# Patient Record
Sex: FEMALE | Race: WHITE | NOT HISPANIC OR LATINO | Employment: FULL TIME | URBAN - METROPOLITAN AREA
[De-identification: names, ages, dates, MRNs, and addresses within clinical notes are randomized per-mention and may not be internally consistent; named-entity substitution may affect disease eponyms.]

---

## 2024-05-10 ENCOUNTER — OFFICE VISIT (OUTPATIENT)
Dept: PRIMARY CARE | Facility: CLINIC | Age: 34
End: 2024-05-10
Payer: COMMERCIAL

## 2024-05-10 VITALS
BODY MASS INDEX: 22.34 KG/M2 | HEART RATE: 65 BPM | HEIGHT: 66 IN | WEIGHT: 139 LBS | SYSTOLIC BLOOD PRESSURE: 112 MMHG | OXYGEN SATURATION: 98 % | DIASTOLIC BLOOD PRESSURE: 74 MMHG

## 2024-05-10 DIAGNOSIS — R53.83 OTHER FATIGUE: ICD-10-CM

## 2024-05-10 DIAGNOSIS — Z00.00 ANNUAL PHYSICAL EXAM: Primary | ICD-10-CM

## 2024-05-10 DIAGNOSIS — M54.9 ACUTE BACK PAIN, UNSPECIFIED BACK LOCATION, UNSPECIFIED BACK PAIN LATERALITY: ICD-10-CM

## 2024-05-10 DIAGNOSIS — Z01.419 ENCOUNTER FOR GYNECOLOGICAL EXAMINATION: ICD-10-CM

## 2024-05-10 LAB
25(OH)D3 SERPL-MCNC: 48 NG/ML (ref 30–100)
ALBUMIN SERPL BCP-MCNC: 4.4 G/DL (ref 3.4–5)
ALP SERPL-CCNC: 32 U/L (ref 33–110)
ALT SERPL W P-5'-P-CCNC: 18 U/L (ref 7–45)
ANION GAP SERPL CALC-SCNC: 14 MMOL/L (ref 10–20)
AST SERPL W P-5'-P-CCNC: 19 U/L (ref 9–39)
BASOPHILS # BLD AUTO: 0.05 X10*3/UL (ref 0–0.1)
BASOPHILS NFR BLD AUTO: 0.8 %
BILIRUB SERPL-MCNC: 0.7 MG/DL (ref 0–1.2)
BUN SERPL-MCNC: 17 MG/DL (ref 6–23)
CALCIUM SERPL-MCNC: 9 MG/DL (ref 8.6–10.6)
CHLORIDE SERPL-SCNC: 104 MMOL/L (ref 98–107)
CHOLEST SERPL-MCNC: 167 MG/DL (ref 0–199)
CHOLESTEROL/HDL RATIO: 3.1
CO2 SERPL-SCNC: 24 MMOL/L (ref 21–32)
CREAT SERPL-MCNC: 0.82 MG/DL (ref 0.5–1.05)
EGFRCR SERPLBLD CKD-EPI 2021: >90 ML/MIN/1.73M*2
EOSINOPHIL # BLD AUTO: 0.19 X10*3/UL (ref 0–0.7)
EOSINOPHIL NFR BLD AUTO: 3.1 %
ERYTHROCYTE [DISTWIDTH] IN BLOOD BY AUTOMATED COUNT: 12.1 % (ref 11.5–14.5)
FERRITIN SERPL-MCNC: 224 NG/ML (ref 8–150)
GLUCOSE SERPL-MCNC: 65 MG/DL (ref 74–99)
HCT VFR BLD AUTO: 44.7 % (ref 36–46)
HDLC SERPL-MCNC: 54.4 MG/DL
HGB BLD-MCNC: 14.4 G/DL (ref 12–16)
IMM GRANULOCYTES # BLD AUTO: 0.02 X10*3/UL (ref 0–0.7)
IMM GRANULOCYTES NFR BLD AUTO: 0.3 % (ref 0–0.9)
IRON SATN MFR SERPL: 67 % (ref 25–45)
IRON SERPL-MCNC: 183 UG/DL (ref 35–150)
LDLC SERPL CALC-MCNC: 101 MG/DL
LYMPHOCYTES # BLD AUTO: 1.85 X10*3/UL (ref 1.2–4.8)
LYMPHOCYTES NFR BLD AUTO: 30.5 %
MCH RBC QN AUTO: 29.9 PG (ref 26–34)
MCHC RBC AUTO-ENTMCNC: 32.2 G/DL (ref 32–36)
MCV RBC AUTO: 93 FL (ref 80–100)
MONOCYTES # BLD AUTO: 0.62 X10*3/UL (ref 0.1–1)
MONOCYTES NFR BLD AUTO: 10.2 %
NEUTROPHILS # BLD AUTO: 3.33 X10*3/UL (ref 1.2–7.7)
NEUTROPHILS NFR BLD AUTO: 55.1 %
NON HDL CHOLESTEROL: 113 MG/DL (ref 0–149)
NRBC BLD-RTO: 0 /100 WBCS (ref 0–0)
PLATELET # BLD AUTO: 235 X10*3/UL (ref 150–450)
POTASSIUM SERPL-SCNC: 4.3 MMOL/L (ref 3.5–5.3)
PROT SERPL-MCNC: 7.2 G/DL (ref 6.4–8.2)
RBC # BLD AUTO: 4.82 X10*6/UL (ref 4–5.2)
SODIUM SERPL-SCNC: 138 MMOL/L (ref 136–145)
T3 SERPL-MCNC: 97 NG/DL (ref 60–200)
T4 SERPL-MCNC: 6 UG/DL (ref 4.5–11.1)
TIBC SERPL-MCNC: 273 UG/DL (ref 240–445)
TRIGL SERPL-MCNC: 56 MG/DL (ref 0–149)
TSH SERPL-ACNC: 1.13 MIU/L (ref 0.44–3.98)
UIBC SERPL-MCNC: 90 UG/DL (ref 110–370)
VIT B12 SERPL-MCNC: 706 PG/ML (ref 211–911)
VLDL: 11 MG/DL (ref 0–40)
WBC # BLD AUTO: 6.1 X10*3/UL (ref 4.4–11.3)

## 2024-05-10 PROCEDURE — 82306 VITAMIN D 25 HYDROXY: CPT

## 2024-05-10 PROCEDURE — 80053 COMPREHEN METABOLIC PANEL: CPT

## 2024-05-10 PROCEDURE — 84480 ASSAY TRIIODOTHYRONINE (T3): CPT

## 2024-05-10 PROCEDURE — 1036F TOBACCO NON-USER: CPT | Performed by: NURSE PRACTITIONER

## 2024-05-10 PROCEDURE — 82728 ASSAY OF FERRITIN: CPT

## 2024-05-10 PROCEDURE — 99203 OFFICE O/P NEW LOW 30 MIN: CPT | Performed by: NURSE PRACTITIONER

## 2024-05-10 PROCEDURE — 36415 COLL VENOUS BLD VENIPUNCTURE: CPT

## 2024-05-10 PROCEDURE — 80061 LIPID PANEL: CPT

## 2024-05-10 PROCEDURE — 85025 COMPLETE CBC W/AUTO DIFF WBC: CPT

## 2024-05-10 PROCEDURE — 82607 VITAMIN B-12: CPT

## 2024-05-10 PROCEDURE — 83540 ASSAY OF IRON: CPT

## 2024-05-10 PROCEDURE — 84436 ASSAY OF TOTAL THYROXINE: CPT

## 2024-05-10 PROCEDURE — 84443 ASSAY THYROID STIM HORMONE: CPT

## 2024-05-10 PROCEDURE — 83550 IRON BINDING TEST: CPT

## 2024-05-10 ASSESSMENT — ENCOUNTER SYMPTOMS
WOUND: 0
DIAPHORESIS: 0
RESPIRATORY NEGATIVE: 1
LIGHT-HEADEDNESS: 0
SLEEP DISTURBANCE: 0
FEVER: 0
FACIAL ASYMMETRY: 0
ENDOCRINE NEGATIVE: 1
DIZZINESS: 0
CHILLS: 0
ACTIVITY CHANGE: 0
EYES NEGATIVE: 1
GASTROINTESTINAL NEGATIVE: 1
HEMATOLOGIC/LYMPHATIC NEGATIVE: 1
WEAKNESS: 0
DEPRESSION: 0
APPETITE CHANGE: 0
MUSCULOSKELETAL NEGATIVE: 1
ALLERGIC/IMMUNOLOGIC NEGATIVE: 1
CARDIOVASCULAR NEGATIVE: 1
POLYPHAGIA: 0
PSYCHIATRIC NEGATIVE: 1
NEUROLOGICAL NEGATIVE: 1
CONFUSION: 0
NERVOUS/ANXIOUS: 0
FATIGUE: 1
UNEXPECTED WEIGHT CHANGE: 0

## 2024-05-10 NOTE — PROGRESS NOTES
"Subjective   Patient ID: Yaneli Reyes is a 33 y.o. female who presents for Establish Care.    HPI   April 34 yo female to [facility] care.  C/o feeling fatigued all the time  C/o back pain due to her job   No numbness or tingling  or loss of bowel or bladder   Eating and drinking ok   Moving bowel and bladder ok     Works at [email or web address]   Work out regularly        PMH: negative   Non surgically hx         Review of Systems   Constitutional:  Positive for fatigue. Negative for activity change, appetite change, chills, diaphoresis, fever and unexpected weight change.   HENT: Negative.     Eyes: Negative.    Respiratory: Negative.     Cardiovascular: Negative.    Gastrointestinal: Negative.    Endocrine: Negative.  Negative for polyphagia.   Genitourinary: Negative.    Musculoskeletal: Negative.    Skin: Negative.  Negative for pallor, rash and wound.   Allergic/Immunologic: Negative.    Neurological: Negative.  Negative for dizziness, facial asymmetry, weakness and light-headedness.   Hematological: Negative.    Psychiatric/Behavioral: Negative.  Negative for confusion, sleep disturbance and suicidal ideas. The patient is not nervous/anxious.    All other systems reviewed and are negative.      Objective   /74 (BP Location: Left arm, Patient Position: Sitting, BP Cuff Size: Adult)   Pulse 65   Ht 1.676 m (5' 6\")   Wt 63 kg (139 lb)   SpO2 98%   BMI 22.44 kg/m²     Physical Exam  Vitals and nursing note reviewed.   Constitutional:       Appearance: Normal appearance. She is normal weight.   HENT:      Head: Normocephalic.      Nose: Nose normal.      Mouth/Throat:      Mouth: Mucous membranes are moist.   Eyes:      Extraocular Movements: Extraocular movements intact.      Conjunctiva/sclera: Conjunctivae normal.      Pupils: Pupils are equal, round, and reactive to light.   Cardiovascular:      Rate and Rhythm: Normal rate and regular rhythm.      Pulses: Normal pulses.      Heart sounds: Normal heart sounds.   Pulmonary: "      Effort: Pulmonary effort is normal.   Abdominal:      General: Abdomen is flat. Bowel sounds are normal.      Palpations: Abdomen is soft.   Musculoskeletal:         General: Normal range of motion.      Cervical back: Normal range of motion.   Skin:     General: Skin is warm and dry.   Neurological:      General: No focal deficit present.      Mental Status: She is alert and oriented to person, place, and time.   Psychiatric:         Mood and Affect: Mood normal.         Behavior: Behavior normal.         Assessment/Plan   1. Annual physical exam  - Vitamin D 25-Hydroxy,Total (for eval of Vitamin D levels)  - CBC and Auto Differential  - Comprehensive Metabolic Panel  - TSH with reflex to Free T4 if abnormal  - Lipid Panel  - T3  - T4  - Ferritin  - Vitamin B12    2. Other fatigue    - Vitamin D 25-Hydroxy,Total (for eval of Vitamin D levels)  - T3  - T4  - Magnesium; Future  - Phosphorus; Future  - Iron and TIBC  - Ferritin  - Vitamin B12    3. Encounter for gynecological examination  - Referral to Gynecology; Future    4. Acute back pain, unspecified back location, unspecified back pain laterality    - XR lumbar spine 2-3 views; Future  - XR thoracic spine 3 views; Future       FU yearly exam or simrane zach

## 2024-12-13 ENCOUNTER — APPOINTMENT (OUTPATIENT)
Dept: OBSTETRICS AND GYNECOLOGY | Facility: CLINIC | Age: 34
End: 2024-12-13
Payer: COMMERCIAL

## 2024-12-13 VITALS
WEIGHT: 137 LBS | BODY MASS INDEX: 22.02 KG/M2 | DIASTOLIC BLOOD PRESSURE: 74 MMHG | SYSTOLIC BLOOD PRESSURE: 108 MMHG | HEIGHT: 66 IN

## 2024-12-13 DIAGNOSIS — Z12.4 SCREENING FOR CERVICAL CANCER: ICD-10-CM

## 2024-12-13 DIAGNOSIS — Z11.3 SCREENING EXAMINATION FOR STI: Primary | ICD-10-CM

## 2024-12-13 DIAGNOSIS — Z01.419 WELL WOMAN EXAM WITH ROUTINE GYNECOLOGICAL EXAM: ICD-10-CM

## 2024-12-13 PROCEDURE — 87661 TRICHOMONAS VAGINALIS AMPLIF: CPT

## 2024-12-13 PROCEDURE — 87591 N.GONORRHOEAE DNA AMP PROB: CPT

## 2024-12-13 PROCEDURE — 99385 PREV VISIT NEW AGE 18-39: CPT

## 2024-12-13 PROCEDURE — 87491 CHLMYD TRACH DNA AMP PROBE: CPT

## 2024-12-13 PROCEDURE — 1036F TOBACCO NON-USER: CPT

## 2024-12-13 PROCEDURE — 3008F BODY MASS INDEX DOCD: CPT

## 2024-12-13 NOTE — PROGRESS NOTES
"Subjective   April Eric is a 34 y.o. female who is here for Annual Exam (New patient - annual exam - patient also mentioned having some on and off cramping when she is not on cycle./Last pap - 5 yrs ago).     Concerns today:  None today     Periods are regular every 28-30 days, lasting 5 days.   Dysmenorrhea: mild, occurring throughout menses.   Cyclic symptoms include none.      Sexual Activity: sexually active, male partner  Pain with intercourse? No   Loss of desire? No   Able to have an orgasm? Yes     History of prior STI: none  Desires STI screening? Yes    Current contraception: none    Last pap: 5 years ago per pt  - normal   History of abnormal Pap smear: no  Family history of uterine or ovarian cancer: no    Last mammogram: n/a   History of abnormal mammogram: no  Family history of breast cancer: no  OB History   No obstetric history on file.      Objective   /74 (BP Location: Left arm, Patient Position: Sitting)   Ht 1.676 m (5' 6\")   Wt 62.1 kg (137 lb)   LMP 11/29/2024    Physical Exam  Constitutional:       General: She is not in acute distress.     Appearance: Normal appearance. She is normal weight. She is not ill-appearing.   Genitourinary:      Vulva, rectum and urethral meatus normal.      No lesions in the vagina.      Right Labia: No rash, lesions, skin changes or Bartholin's cyst.     Left Labia: No lesions, skin changes, Bartholin's cyst or rash.     No labial fusion noted.      No vaginal discharge, erythema or tenderness.        Right Adnexa: not tender, not full and no mass present.     Left Adnexa: not tender, not full and no mass present.     No cervical discharge, friability, lesion, polyp or nabothian cyst.      Uterus is not enlarged, fixed, tender or prolapsed.      No uterine mass detected.     Pelvic exam was performed with patient in the lithotomy position.   Breasts:     Breasts are soft.     Right: Normal.      Left: Normal.   HENT:      Head: Normocephalic.      Nose: " Nose normal. No congestion.      Mouth/Throat:      Mouth: Mucous membranes are moist.      Pharynx: Oropharynx is clear. No oropharyngeal exudate or posterior oropharyngeal erythema.   Eyes:      General:         Right eye: No discharge.         Left eye: No discharge.      Extraocular Movements: Extraocular movements intact.      Conjunctiva/sclera: Conjunctivae normal.      Pupils: Pupils are equal, round, and reactive to light.   Neck:      Thyroid: No thyroid mass, thyromegaly or thyroid tenderness.   Cardiovascular:      Rate and Rhythm: Normal rate and regular rhythm.      Heart sounds: Normal heart sounds.   Pulmonary:      Effort: Pulmonary effort is normal.      Breath sounds: Normal breath sounds and air entry.   Abdominal:      General: Abdomen is flat. There is no distension.      Palpations: Abdomen is soft.   Musculoskeletal:         General: No swelling, deformity or signs of injury. Normal range of motion.      Cervical back: Full passive range of motion without pain and normal range of motion. No edema, erythema or tenderness.   Neurological:      General: No focal deficit present.      Mental Status: She is alert and oriented to person, place, and time. Mental status is at baseline.      Sensory: No sensory deficit.      Motor: No weakness.      Gait: Gait normal.   Skin:     General: Skin is warm and dry.      Findings: No bruising, erythema, lesion or rash.   Psychiatric:         Mood and Affect: Mood normal.         Behavior: Behavior normal.         Thought Content: Thought content normal.         Judgment: Judgment normal.          Assessment/Plan   Diagnoses and all orders for this visit:  Screening examination for STI  -     HIV 1/2 Antigen/Antibody Screen with Reflex to Confirmation; Future  -     Syphilis Screen with Reflex; Future  -     Hepatitis C Antibody; Future  -     Hepatitis B Surface Antigen; Future  -     Trichomonas vaginalis, Amplified  -     C. trachomatis / N. gonorrhoeae,  Amplified  -     HPV DNA High Risk With Genotype  Screening for cervical cancer  -     THINPREP PAP TEST  Well woman exam with routine gynecological exam    No follow-ups on file.  Nina lEi, APRN-CNM

## 2024-12-14 LAB
C TRACH RRNA SPEC QL NAA+PROBE: NEGATIVE
N GONORRHOEA DNA SPEC QL PROBE+SIG AMP: NEGATIVE
T VAGINALIS RRNA SPEC QL NAA+PROBE: NEGATIVE

## 2025-01-02 LAB
CYTOLOGY CMNT CVX/VAG CYTO-IMP: NORMAL
HPV HR 12 DNA GENITAL QL NAA+PROBE: NEGATIVE
HPV HR GENOTYPES PNL CVX NAA+PROBE: POSITIVE
HPV16 DNA SPEC QL NAA+PROBE: NEGATIVE
HPV18 DNA SPEC QL NAA+PROBE: POSITIVE
LAB AP HPV GENOTYPE QUESTION: YES
LAB AP HPV HR: NORMAL
LABORATORY COMMENT REPORT: NORMAL
PATH REPORT.TOTAL CANCER: NORMAL

## 2025-02-05 ENCOUNTER — APPOINTMENT (OUTPATIENT)
Dept: OBSTETRICS AND GYNECOLOGY | Facility: CLINIC | Age: 35
End: 2025-02-05
Payer: COMMERCIAL

## 2025-02-05 VITALS
SYSTOLIC BLOOD PRESSURE: 110 MMHG | WEIGHT: 137.7 LBS | BODY MASS INDEX: 22.13 KG/M2 | DIASTOLIC BLOOD PRESSURE: 72 MMHG | HEIGHT: 66 IN

## 2025-02-05 DIAGNOSIS — R87.611 ATYPICAL SQUAMOUS CELLS CANNOT EXCLUDE HIGH GRADE SQUAMOUS INTRAEPITHELIAL LESION ON CYTOLOGIC SMEAR OF CERVIX (ASC-H): ICD-10-CM

## 2025-02-05 DIAGNOSIS — R87.810 HUMAN PAPILLOMAVIRUS (HPV) TYPE 18 DNA DETECTED IN CERVICAL SPECIMEN: ICD-10-CM

## 2025-02-05 LAB — PREGNANCY TEST URINE, POC: NEGATIVE

## 2025-02-05 PROCEDURE — 81025 URINE PREGNANCY TEST: CPT | Performed by: OBSTETRICS & GYNECOLOGY

## 2025-02-05 RX ORDER — AMOXICILLIN AND CLAVULANATE POTASSIUM 875; 125 MG/1; MG/1
1 TABLET, FILM COATED ORAL 2 TIMES DAILY
COMMUNITY
Start: 2025-01-30

## 2025-02-05 NOTE — PROGRESS NOTES
GYNECOLOGY PROGRESS NOTE          CC:     Chief Complaint   Patient presents with    Procedure     Colpo procedure  Pap 12/2024 ASC-H w/HPV pippa 18 positive  Preg test neg  Chaperone kirby ibarra ma          HPI:  April Eric is scheduled today for COLPO for abnormal Pap smear.      Pap results:  ASC-H, + HPV 18 .  Last pap ~5 years ago and wnl.   No prior abnormal paps, no prior COLPOs.  No new GYN complaints.  No AUB or vaginal discharge.    Gardasil:  no  Smoker:   no (prior smoker)      ROS:  GYN - see HPI      HISTORY:  Past Surgical History:   Procedure Laterality Date    NO PAST SURGERIES       Social History     Tobacco Use    Smoking status: Former     Types: Cigarettes    Smokeless tobacco: Never   Vaping Use    Vaping status: Never Used   Substance Use Topics    Alcohol use: Not Currently    Drug use: Not Currently     Types: Marijuana           PHYSICAL EXAM:  GEN:  A&O, NAD  URO:  normal urethral meatus  GYN:  normal vulva and perineum w/o lesions or ulcers, normal vagina without discharge or lesions, normal cervix without lesions or discharge  PSYCH:  normal affect, non-anxious      BLUE=TZ (wide)  RED= negative iodine uptake  GREEN=biopsy sites      ----------------------------------------------------------------------------    COLPOSCOPY PROCEDURE NOTE    Patient was consented, risks of procedure reviewed.  Patient was placed in lithotomy position and a speculum was placed.  The cervix was fully visualized and was cleansed with saline.  Visual inspection was performed and the transformation zone was fully visualized.  Acetic acid was placed on the cervix and any aceto-white changes were noted, no atypical lesions seen.  Lugol's iodine solution was then placed on the cervix and any non-staining areas were identified (see cervix diagram).  Cervical biopsies were performed at 4/10/1 o'clock,.  An ECC was collected using a [employer] device and the cytology was  collected with a Cytobrush.   Silver nitrate  was applied to the biopsy sites as needed for hemostasis.  The patient tolerated the procedure well.  EBL was minimal and there were no complications.    ----------------------------------------------------------------------------    IMPRESSION/PLAN:  Problem List Items Addressed This Visit    None  Visit Diagnoses       Atypical squamous cells cannot exclude high grade squamous intraepithelial lesion on cytologic smear of cervix (ASC-H)        Relevant Orders    POCT pregnancy, urine manually resulted    Cervical high risk human papillomavirus (HPV) DNA test positive        Relevant Orders    POCT pregnancy, urine manually resulted             Abnormal pap smear:  Counseling done on abnormal paps and COLPO.  Natural course of HPV and high rates of regression of LGSIL reviewed with patient.  Need for treatment if HGSIL discussed.  COLPO done today, patient tolerated well.  NOT good candidate for office LEEP due to bleeding with biopsies that required Monsel's solution.  Risk of HGSIL/need for treatment per ASCCP rosemarie calculator=15%.  Smoking:  no (prior smoker).  Gardasil:  no.    Gardasil vaccination:  Gardasil vaccine counseling done and vaccination highly recommended.   Patient has not previously been vaccinated.  Will check insurance benefits and patient to schedule a nurse appt for vaccination if elects to proceed.  Use and side effects reviewed.  Indications for prevention of genital warts, cervical dysplasia, and cervical cancer reviewed.  Prevalence of HPV in the [place] reviewed.        F/U in 2-3 weeks for virtual visit for COLPO results.      Star Romero DO

## 2025-02-15 LAB
LABORATORY COMMENT REPORT: NORMAL
PATH REPORT.FINAL DX SPEC: NORMAL
PATH REPORT.GROSS SPEC: NORMAL
PATH REPORT.RELEVANT HX SPEC: NORMAL
PATH REPORT.TOTAL CANCER: NORMAL